# Patient Record
Sex: FEMALE | URBAN - METROPOLITAN AREA
[De-identification: names, ages, dates, MRNs, and addresses within clinical notes are randomized per-mention and may not be internally consistent; named-entity substitution may affect disease eponyms.]

---

## 2020-06-17 ENCOUNTER — OFFICE VISIT (OUTPATIENT)
Dept: URBAN - METROPOLITAN AREA CLINIC 71 | Facility: CLINIC | Age: 85
End: 2020-06-17
Payer: MEDICARE

## 2020-06-17 DIAGNOSIS — Z96.1 PRESENCE OF INTRAOCULAR LENS: ICD-10-CM

## 2020-06-17 DIAGNOSIS — H52.4 PRESBYOPIA: ICD-10-CM

## 2020-06-17 DIAGNOSIS — H04.123 DRY EYE SYNDROME OF BILATERAL LACRIMAL GLANDS: ICD-10-CM

## 2020-06-17 DIAGNOSIS — H43.813 VITREOUS DEGENERATION, BILATERAL: ICD-10-CM

## 2020-06-17 DIAGNOSIS — H35.373 PUCKERING OF MACULA, BILATERAL: Primary | ICD-10-CM

## 2020-06-17 PROCEDURE — 92134 CPTRZ OPH DX IMG PST SGM RTA: CPT | Performed by: OPTOMETRIST

## 2020-06-17 PROCEDURE — 92014 COMPRE OPH EXAM EST PT 1/>: CPT | Performed by: OPTOMETRIST

## 2020-06-17 ASSESSMENT — VISUAL ACUITY
OS: 20/100
OD: 20/25

## 2020-06-17 ASSESSMENT — INTRAOCULAR PRESSURE
OS: 17
OD: 17

## 2020-06-17 NOTE — IMPRESSION/PLAN
Impression: Dry eye syndrome of bilateral lacrimal glands: H04.123. 2+ K roughness OS. Plan: Discussed. Continue use of AT's 4+ times per day in both eyes. Recommend nighttime OTC ointment.

## 2020-06-17 NOTE — IMPRESSION/PLAN
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 Trace ERM OD, s/p Peel stable OS. Plan: monitor.

## 2020-06-17 NOTE — IMPRESSION/PLAN
Impression: Presbyopia: H52.4. Plan: A glasses Rx has been generated and dispensed today. Pt to call with any concerns.